# Patient Record
Sex: FEMALE | Race: WHITE | NOT HISPANIC OR LATINO
[De-identification: names, ages, dates, MRNs, and addresses within clinical notes are randomized per-mention and may not be internally consistent; named-entity substitution may affect disease eponyms.]

---

## 2021-02-27 ENCOUNTER — RESULT REVIEW (OUTPATIENT)
Age: 31
End: 2021-02-27

## 2022-07-18 PROBLEM — Z00.00 ENCOUNTER FOR PREVENTIVE HEALTH EXAMINATION: Status: ACTIVE | Noted: 2022-07-18

## 2022-07-19 ENCOUNTER — APPOINTMENT (OUTPATIENT)
Dept: COLORECTAL SURGERY | Facility: CLINIC | Age: 32
End: 2022-07-19

## 2022-07-19 DIAGNOSIS — K64.4 RESIDUAL HEMORRHOIDAL SKIN TAGS: ICD-10-CM

## 2022-07-19 DIAGNOSIS — K62.89 OTHER SPECIFIED DISEASES OF ANUS AND RECTUM: ICD-10-CM

## 2022-07-19 PROCEDURE — 46221 LIGATION OF HEMORRHOID(S): CPT

## 2022-07-19 PROCEDURE — 99204 OFFICE O/P NEW MOD 45 MIN: CPT | Mod: 25

## 2022-08-19 NOTE — PHYSICAL EXAM
[Normal rectal exam] : exam was normal [Reduce Spontaneously] : a spontaneously reducible (grade II) [Normal] : was normal [None] : there was no rectal mass  [Alert] : alert [Oriented to Person] : oriented to person [Oriented to Place] : oriented to place [Oriented to Time] : oriented to time [Calm] : calm [Gross Blood] : no gross blood [de-identified] : small right anterior with associated hypertrophied anal papillae [de-identified] : NAD [de-identified] : NCAT [de-identified] : FIELD x 4

## 2022-08-19 NOTE — HISTORY OF PRESENT ILLNESS
[FreeTextEntry1] : Ms. Rush presents to the office for consultation secondary to an prolapsing hemorrhoidal tissue. She notes that this has been an issue over the last several months but recently, has become more problematic. The site will swell and cause discomfort. There is no associated bleeding.  BMs are passed fairly regularly and she has adjusted her diet so that she is more regular. Here for evaulation and possible treatment.

## 2022-08-19 NOTE — PROCEDURE
[FreeTextEntry1] : RBL to RA hypertrophied anal papillae\par Procedure:\par The risks and benefits of the rubber band ligation were discussed with the patient. This included but was not limited to bleeding and sepsis the feeling of fullness in the rectum and the anticipated bleeding when the band falls off in 5-7 days.The hemorrhoid was grasped with the hemorrhoidal grasper. There was no sensation. A standard rubber band was applied to this hemorrhoid. The patient tolerated the procedure well without any complications.\par \par

## 2022-08-19 NOTE — ASSESSMENT
[FreeTextEntry1] : Ms. Rush presents to the office for consultation for hemorrhoid prolapse with associated discomfort. PE today was fairly unrevealing though her right anterior external hemorrhoidal column was more pronounced and was associated with a large anal papillae noted on anoscopy. In office today, we proceeded to RBL this RA papillae to see if this would help resolve the hemorrhoidal prolapse. She was advised on what to expect post procedure and if there is no resolution of symptoms, she will return to the office in 2-3 weeks for another anorectal exam to help pinpoint source of symptoms.